# Patient Record
Sex: FEMALE | Race: WHITE | NOT HISPANIC OR LATINO | Employment: OTHER | URBAN - METROPOLITAN AREA
[De-identification: names, ages, dates, MRNs, and addresses within clinical notes are randomized per-mention and may not be internally consistent; named-entity substitution may affect disease eponyms.]

---

## 2017-10-07 ENCOUNTER — APPOINTMENT (OUTPATIENT)
Dept: GENERAL RADIOLOGY | Facility: HOSPITAL | Age: 67
End: 2017-10-07

## 2017-10-07 ENCOUNTER — HOSPITAL ENCOUNTER (EMERGENCY)
Facility: HOSPITAL | Age: 67
Discharge: HOME | End: 2017-10-08
Attending: EMERGENCY MEDICINE | Admitting: EMERGENCY MEDICINE

## 2017-10-07 DIAGNOSIS — J44.1 COPD EXACERBATION (HCC): Primary | ICD-10-CM

## 2017-10-07 DIAGNOSIS — J40 BRONCHITIS: ICD-10-CM

## 2017-10-07 LAB
ALBUMIN SERPL-MCNC: 3.9 G/DL (ref 3.5–5.2)
ALBUMIN/GLOB SERPL: 1.1 G/DL
ALP SERPL-CCNC: 110 U/L (ref 39–117)
ALT SERPL W P-5'-P-CCNC: 14 U/L (ref 1–33)
ANION GAP SERPL CALCULATED.3IONS-SCNC: 14.9 MMOL/L
AST SERPL-CCNC: 13 U/L (ref 1–32)
BASOPHILS # BLD AUTO: 0.02 10*3/MM3 (ref 0–0.2)
BASOPHILS NFR BLD AUTO: 0.3 % (ref 0–1.5)
BILIRUB SERPL-MCNC: 0.4 MG/DL (ref 0.1–1.2)
BUN BLD-MCNC: 12 MG/DL (ref 8–23)
BUN/CREAT SERPL: 15.8 (ref 7–25)
CALCIUM SPEC-SCNC: 9.6 MG/DL (ref 8.6–10.5)
CHLORIDE SERPL-SCNC: 98 MMOL/L (ref 98–107)
CO2 SERPL-SCNC: 24.1 MMOL/L (ref 22–29)
CREAT BLD-MCNC: 0.76 MG/DL (ref 0.57–1)
D-LACTATE SERPL-SCNC: 0.9 MMOL/L (ref 0.5–2)
DEPRECATED RDW RBC AUTO: 44.3 FL (ref 37–54)
EOSINOPHIL # BLD AUTO: 0.16 10*3/MM3 (ref 0–0.7)
EOSINOPHIL NFR BLD AUTO: 2.2 % (ref 0.3–6.2)
ERYTHROCYTE [DISTWIDTH] IN BLOOD BY AUTOMATED COUNT: 12.9 % (ref 11.7–13)
GFR SERPL CREATININE-BSD FRML MDRD: 76 ML/MIN/1.73
GLOBULIN UR ELPH-MCNC: 3.5 GM/DL
GLUCOSE BLD-MCNC: 118 MG/DL (ref 65–99)
HCT VFR BLD AUTO: 48.7 % (ref 35.6–45.5)
HGB BLD-MCNC: 16.2 G/DL (ref 11.9–15.5)
HOLD SPECIMEN: NORMAL
HOLD SPECIMEN: NORMAL
IMM GRANULOCYTES # BLD: 0 10*3/MM3 (ref 0–0.03)
IMM GRANULOCYTES NFR BLD: 0 % (ref 0–0.5)
INR PPP: 1 (ref 0.9–1.1)
LYMPHOCYTES # BLD AUTO: 2.45 10*3/MM3 (ref 0.9–4.8)
LYMPHOCYTES NFR BLD AUTO: 33.3 % (ref 19.6–45.3)
MCH RBC QN AUTO: 31.5 PG (ref 26.9–32)
MCHC RBC AUTO-ENTMCNC: 33.3 G/DL (ref 32.4–36.3)
MCV RBC AUTO: 94.7 FL (ref 80.5–98.2)
MONOCYTES # BLD AUTO: 0.34 10*3/MM3 (ref 0.2–1.2)
MONOCYTES NFR BLD AUTO: 4.6 % (ref 5–12)
NEUTROPHILS # BLD AUTO: 4.39 10*3/MM3 (ref 1.9–8.1)
NEUTROPHILS NFR BLD AUTO: 59.6 % (ref 42.7–76)
PLATELET # BLD AUTO: 300 10*3/MM3 (ref 140–500)
PMV BLD AUTO: 9.7 FL (ref 6–12)
POTASSIUM BLD-SCNC: 4.4 MMOL/L (ref 3.5–5.2)
PROCALCITONIN SERPL-MCNC: <0.02 NG/ML (ref 0.1–0.25)
PROT SERPL-MCNC: 7.4 G/DL (ref 6–8.5)
PROTHROMBIN TIME: 12.8 SECONDS (ref 11.7–14.2)
RBC # BLD AUTO: 5.14 10*6/MM3 (ref 3.9–5.2)
SODIUM BLD-SCNC: 137 MMOL/L (ref 136–145)
WBC NRBC COR # BLD: 7.36 10*3/MM3 (ref 4.5–10.7)
WHOLE BLOOD HOLD SPECIMEN: NORMAL
WHOLE BLOOD HOLD SPECIMEN: NORMAL

## 2017-10-07 PROCEDURE — 36415 COLL VENOUS BLD VENIPUNCTURE: CPT

## 2017-10-07 PROCEDURE — 83605 ASSAY OF LACTIC ACID: CPT | Performed by: EMERGENCY MEDICINE

## 2017-10-07 PROCEDURE — 84145 PROCALCITONIN (PCT): CPT | Performed by: EMERGENCY MEDICINE

## 2017-10-07 PROCEDURE — 71020 HC CHEST PA AND LATERAL: CPT

## 2017-10-07 PROCEDURE — 94799 UNLISTED PULMONARY SVC/PX: CPT

## 2017-10-07 PROCEDURE — 93005 ELECTROCARDIOGRAM TRACING: CPT

## 2017-10-07 PROCEDURE — 87040 BLOOD CULTURE FOR BACTERIA: CPT | Performed by: EMERGENCY MEDICINE

## 2017-10-07 PROCEDURE — 80053 COMPREHEN METABOLIC PANEL: CPT | Performed by: EMERGENCY MEDICINE

## 2017-10-07 PROCEDURE — 85025 COMPLETE CBC W/AUTO DIFF WBC: CPT | Performed by: EMERGENCY MEDICINE

## 2017-10-07 PROCEDURE — 85610 PROTHROMBIN TIME: CPT | Performed by: EMERGENCY MEDICINE

## 2017-10-07 PROCEDURE — 99284 EMERGENCY DEPT VISIT MOD MDM: CPT

## 2017-10-07 PROCEDURE — 94640 AIRWAY INHALATION TREATMENT: CPT

## 2017-10-07 PROCEDURE — 93010 ELECTROCARDIOGRAM REPORT: CPT | Performed by: INTERNAL MEDICINE

## 2017-10-07 RX ORDER — METOPROLOL SUCCINATE 25 MG/1
12.5 TABLET, EXTENDED RELEASE ORAL
COMMUNITY
Start: 2017-07-17

## 2017-10-07 RX ORDER — CHLORAL HYDRATE 500 MG
1000 CAPSULE ORAL
COMMUNITY

## 2017-10-07 RX ORDER — MELOXICAM 7.5 MG/1
7.5 TABLET ORAL 2 TIMES DAILY
COMMUNITY

## 2017-10-07 RX ORDER — ALBUTEROL SULFATE 2.5 MG/3ML
2.5 SOLUTION RESPIRATORY (INHALATION) ONCE
Status: COMPLETED | OUTPATIENT
Start: 2017-10-07 | End: 2017-10-07

## 2017-10-07 RX ORDER — CLOPIDOGREL BISULFATE 75 MG/1
75 TABLET ORAL DAILY
COMMUNITY
Start: 2017-07-20

## 2017-10-07 RX ORDER — FLUTICASONE PROPIONATE 50 MCG
2 SPRAY, SUSPENSION (ML) NASAL DAILY
COMMUNITY

## 2017-10-07 RX ORDER — ONDANSETRON HYDROCHLORIDE 8 MG/1
8 TABLET, FILM COATED ORAL EVERY 6 HOURS PRN
COMMUNITY

## 2017-10-07 RX ORDER — DOXYCYCLINE 100 MG/1
100 CAPSULE ORAL 2 TIMES DAILY
Qty: 14 CAPSULE | Refills: 0 | Status: SHIPPED | OUTPATIENT
Start: 2017-10-07

## 2017-10-07 RX ORDER — OMEPRAZOLE 20 MG/1
20 CAPSULE, DELAYED RELEASE ORAL DAILY
COMMUNITY
Start: 2017-01-12 | End: 2018-01-12

## 2017-10-07 RX ORDER — FLUTICASONE PROPIONATE 50 MCG
SPRAY, SUSPENSION (ML) NASAL
COMMUNITY
Start: 2017-07-24 | End: 2017-10-07 | Stop reason: SDUPTHER

## 2017-10-07 RX ORDER — IPRATROPIUM BROMIDE AND ALBUTEROL SULFATE 2.5; .5 MG/3ML; MG/3ML
3 SOLUTION RESPIRATORY (INHALATION) ONCE
Status: COMPLETED | OUTPATIENT
Start: 2017-10-07 | End: 2017-10-07

## 2017-10-07 RX ORDER — PREDNISONE 20 MG/1
20 TABLET ORAL DAILY
Qty: 15 TABLET | Refills: 0 | Status: SHIPPED | OUTPATIENT
Start: 2017-10-07

## 2017-10-07 RX ORDER — LORATADINE 10 MG/1
10 TABLET ORAL DAILY
COMMUNITY

## 2017-10-07 RX ORDER — HYDROCODONE BITARTRATE AND ACETAMINOPHEN 7.5; 325 MG/1; MG/1
1-2 TABLET ORAL EVERY 4 HOURS PRN
COMMUNITY

## 2017-10-07 RX ORDER — LORATADINE 10 MG/1
TABLET ORAL
COMMUNITY
Start: 2017-08-01 | End: 2017-10-07 | Stop reason: SDUPTHER

## 2017-10-07 RX ORDER — SODIUM CHLORIDE 0.9 % (FLUSH) 0.9 %
10 SYRINGE (ML) INJECTION AS NEEDED
Status: DISCONTINUED | OUTPATIENT
Start: 2017-10-07 | End: 2017-10-08 | Stop reason: HOSPADM

## 2017-10-07 RX ORDER — ALBUTEROL SULFATE 90 UG/1
2 AEROSOL, METERED RESPIRATORY (INHALATION) EVERY 4 HOURS PRN
Qty: 1 INHALER | Refills: 0 | Status: SHIPPED | OUTPATIENT
Start: 2017-10-07

## 2017-10-07 RX ORDER — ATORVASTATIN CALCIUM 20 MG/1
20 TABLET, FILM COATED ORAL DAILY
COMMUNITY
Start: 2017-08-31

## 2017-10-07 RX ADMIN — ALBUTEROL SULFATE 2.5 MG: 2.5 SOLUTION RESPIRATORY (INHALATION) at 21:42

## 2017-10-07 RX ADMIN — IPRATROPIUM BROMIDE AND ALBUTEROL SULFATE 3 ML: .5; 3 SOLUTION RESPIRATORY (INHALATION) at 21:42

## 2017-10-07 NOTE — ED NOTES
Pt had ear surgery Wednesday and it feels numb and swollen to her.      Bora France RN  10/07/17 7352

## 2017-10-08 VITALS
TEMPERATURE: 97.4 F | HEIGHT: 65 IN | DIASTOLIC BLOOD PRESSURE: 58 MMHG | SYSTOLIC BLOOD PRESSURE: 101 MMHG | WEIGHT: 137 LBS | BODY MASS INDEX: 22.82 KG/M2 | RESPIRATION RATE: 18 BRPM | OXYGEN SATURATION: 92 % | HEART RATE: 76 BPM

## 2017-10-08 NOTE — ED PROVIDER NOTES
"EMERGENCY DEPARTMENT ENCOUNTER    CHIEF COMPLAINT  Chief Complaint: SOB  History given by: pt  History limited by: N/A  Room Number: 31/31  PMD: Matty Lomeli MD      HPI:  Pt is a 67 y.o. female who presents complaining ofsudden SOB that began at approximately 1300 today and pt states she got to the point where \"she could hardly breathe\". Pt also c/o cough and dysphagia, but denies any other pertinent symptoms. Pt denies hx of pneumonia, but does have a hx COPD.    Duration: 8-9 hours  Onset: sudden   Timing: constant   Location: chest   Radiation: N/A  Quality: SOA  Intensity/Severity: moderate  Progression: unchanged   Associated Symptoms: cough   Aggravating Factors: None reported   Alleviating Factors: None reported  Previous Episodes: Pt has a hx of COPD.  Treatment before arrival: None reported.     PAST MEDICAL HISTORY  Active Ambulatory Problems     Diagnosis Date Noted   • No Active Ambulatory Problems     Resolved Ambulatory Problems     Diagnosis Date Noted   • No Resolved Ambulatory Problems     No Additional Past Medical History       PAST SURGICAL HISTORY  Past Surgical History:   Procedure Laterality Date   • INNER EAR SURGERY     • TUBAL ABDOMINAL LIGATION         FAMILY HISTORY  History reviewed. No pertinent family history.    SOCIAL HISTORY  Social History     Social History   • Marital status:      Spouse name: N/A   • Number of children: N/A   • Years of education: N/A     Occupational History   • Not on file.     Social History Main Topics   • Smoking status: Current Every Day Smoker     Packs/day: 0.50   • Smokeless tobacco: Not on file   • Alcohol use No   • Drug use: Not on file   • Sexual activity: Not on file     Other Topics Concern   • Not on file     Social History Narrative   • No narrative on file       ALLERGIES  Asa [aspirin] and Penicillins    REVIEW OF SYSTEMS  Review of Systems   Constitutional: Negative.  Negative for chills and fever.   HENT: Positive for trouble " swallowing. Negative for sore throat.    Eyes: Negative.    Respiratory: Positive for cough and shortness of breath.    Cardiovascular: Negative.  Negative for chest pain.   Gastrointestinal: Negative.    Genitourinary: Negative.  Negative for dysuria.   Musculoskeletal: Negative.  Negative for back pain.   Skin: Negative.  Negative for rash.   Neurological: Negative.  Negative for headaches.       PHYSICAL EXAM  ED Triage Vitals   Temp Heart Rate Resp BP SpO2   10/07/17 1804 10/07/17 1804 10/07/17 1804 10/07/17 1812 10/07/17 1804   97.4 °F (36.3 °C) 86 18 164/96 95 %      Temp src Heart Rate Source Patient Position BP Location FiO2 (%)   -- 10/07/17 1927 10/07/17 1812 10/07/17 1812 --    Monitor Sitting Left arm        Physical Exam   Constitutional: She is oriented to person, place, and time and well-developed, well-nourished, and in no distress. No distress.   HENT:   Head: Normocephalic and atraumatic.   Bandage over the right ear from a recent ear surgery. Will leave dressing in place.    Eyes: EOM are normal. Pupils are equal, round, and reactive to light.   Neck: Normal range of motion. Neck supple.   Cardiovascular: Normal rate, regular rhythm and normal heart sounds.    Pulmonary/Chest: Effort normal and breath sounds normal. No respiratory distress.   Diminshed breath sounds bilaterally    Abdominal: Soft. Bowel sounds are normal. There is no tenderness. There is no rebound and no guarding.   Musculoskeletal: Normal range of motion. She exhibits no edema.   Neurological: She is alert and oriented to person, place, and time. She has normal sensation and normal strength.   Skin: Skin is warm and dry. No rash noted.   Psychiatric: Mood and affect normal.   Nursing note and vitals reviewed.      LAB RESULTS  Lab Results (last 24 hours)     Procedure Component Value Units Date/Time    CBC & Differential [[id number]] Collected:  10/07/17 2156    Specimen:  Blood Updated:  10/07/17 2209    Narrative:       The  following orders were created for panel order CBC & Differential.  Procedure                               Abnormality         Status                     ---------                               -----------         ------                     CBC Auto Differential[[id number]]        Abnormal            Final result                 Please view results for these tests on the individual orders.    Comprehensive Metabolic Panel [[id number]]  (Abnormal) Collected:  10/07/17 2156    Specimen:  Blood Updated:  10/07/17 2230     Glucose 118 (H) mg/dL      BUN 12 mg/dL      Creatinine 0.76 mg/dL      Sodium 137 mmol/L      Potassium 4.4 mmol/L      Chloride 98 mmol/L      CO2 24.1 mmol/L      Calcium 9.6 mg/dL      Total Protein 7.4 g/dL      Albumin 3.90 g/dL      ALT (SGPT) 14 U/L      AST (SGOT) 13 U/L      Alkaline Phosphatase 110 U/L      Total Bilirubin 0.4 mg/dL      eGFR Non African Amer 76 mL/min/1.73      Globulin 3.5 gm/dL      A/G Ratio 1.1 g/dL      BUN/Creatinine Ratio 15.8     Anion Gap 14.9 mmol/L     Protime-INR [[id number]]  (Normal) Collected:  10/07/17 2156    Specimen:  Blood Updated:  10/07/17 2219     Protime 12.8 Seconds      INR 1.00    Blood Culture - Blood, [[id number]] Collected:  10/07/17 2156    Specimen:  Blood from Arm, Left Updated:  10/07/17 2200    Lactic Acid, Plasma [[id number]]  (Normal) Collected:  10/07/17 2156    Specimen:  Blood Updated:  10/07/17 2222     Lactate 0.9 mmol/L     Procalcitonin [[id number]]  (Abnormal) Collected:  10/07/17 2156    Specimen:  Blood Updated:  10/07/17 2250     Procalcitonin <0.02 (L) ng/mL     Narrative:       As a Marker for Sepsis (Non-Neonates):   1. <0.5 ng/mL represents a low risk of severe sepsis and/or septic shock.  1. >2 ng/mL represents a high risk of severe sepsis and/or septic shock.    As a Marker for Lower Respiratory Tract Infections that require antibiotic therapy:  PCT on Admission     Antibiotic Therapy             6-12 Hrs later  > 0.5             "    Strongly Recommended            >0.25 - <0.5         Recommended  0.1 - 0.25           Discouraged                   Remeasure/reassess PCT  <0.1                 Strongly Discouraged          Remeasure/reassess PCT      As 28 day mortality risk marker: \"Change in Procalcitonin Result\" (> 80 % or <=80 %) if Day 0 (or Day 1) and Day 4 values are available. Refer to http://www.[employer] [facility] [employer] Ipct-calculator.com/   Change in PCT <=80 %   A decrease of PCT levels below or equal to 80 % defines a positive change in PCT test result representing a higher risk for 28-day all-cause mortality of patients diagnosed with severe sepsis or septic shock.  Change in PCT > 80 %   A decrease of PCT levels of more than 80 % defines a negative change in PCT result representing a lower risk for 28-day all-cause mortality of patients diagnosed with severe sepsis or septic shock.                CBC Auto Differential [[id number]]  (Abnormal) Collected:  10/07/17 2156    Specimen:  Blood Updated:  10/07/17 2209     WBC 7.36 10*3/mm3      RBC 5.14 10*6/mm3      Hemoglobin 16.2 (H) g/dL      Hematocrit 48.7 (H) %      MCV 94.7 fL      MCH 31.5 pg      MCHC 33.3 g/dL      RDW 12.9 %      RDW-SD 44.3 fl      MPV 9.7 fL      Platelets 300 10*3/mm3      Neutrophil % 59.6 %      Lymphocyte % 33.3 %      Monocyte % 4.6 (L) %      Eosinophil % 2.2 %      Basophil % 0.3 %      Immature Grans % 0.0 %      Neutrophils, Absolute 4.39 10*3/mm3      Lymphocytes, Absolute 2.45 10*3/mm3      Monocytes, Absolute 0.34 10*3/mm3      Eosinophils, Absolute 0.16 10*3/mm3      Basophils, Absolute 0.02 10*3/mm3      Immature Grans, Absolute 0.00 10*3/mm3     Blood Culture - Blood, [[id number]] Collected:  10/07/17 2218    Specimen:  Blood from Arm, Left Updated:  10/07/17 2227          I ordered the above labs and reviewed the results    RADIOLOGY  XR Chest 2 View   Preliminary Result   Opacity at the left lung base may be atelectasis or infiltrate.                   I " ordered the above noted radiological studies. Interpreted by radiologist. Reviewed by me in PACS.       PROCEDURES  Procedures  EKG           EKG time: 1834  Rhythm/Rate: rate 76, sinus rhythm  P waves and HI: normal  QRS, axis: normal  ST and T waves: Nonspecific    Occasional PAC    Interpreted Contemporaneously by me, independently viewed  No previous for comparison.       PROGRESS AND CONSULTS  ED Course     2127: Ordered labs and Chest XR for further evaluation. Ordered Duo-neb and Proventil for SOB.     0011: Rechecked pt who states she is feeling improved after breathing treatments were administered. Discussed lab and radiology results with pt. Discussed that it appears pt has bronchitis; plan to place pt on steroids and discharge pt home. Discussed there are no signs of pneumonia. Pt understands and agrees to plan. All questions addressed at time.     MEDICAL DECISION MAKING  Results were reviewed/discussed with the patient and they were also made aware of online access. Pt also made aware that some labs, such as cultures, will not be resulted during ER visit and follow up with PMD is necessary.     MDM  Number of Diagnoses or Management Options     Amount and/or Complexity of Data Reviewed  Clinical lab tests: ordered and reviewed (Procalcitonin <0.02, HGB = 16.2)  Tests in the radiology section of CPT®: ordered and reviewed (Chest XR Impression   Opacity at the left lung base may be atelectasis or infiltrate.       )  Tests in the medicine section of CPT®: ordered and reviewed (Refer to procedure. )  Independent visualization of images, tracings, or specimens: yes           DIAGNOSIS  Final diagnoses:   COPD exacerbation   Bronchitis       DISPOSITION  DISCHARGE    Patient discharged in stable condition.    Reviewed implications of results, diagnosis, meds, responsibility to follow up, warning signs and symptoms of possible worsening, potential complications and reasons to return to ER.    Patient/Family  voiced understanding of above instructions.    Discussed plan for discharge, as there is no emergent indication for admission.  Pt/family is agreeable and understands need for follow up and repeat testing.  Pt is aware that discharge does not mean that nothing is wrong but it indicates no emergency is present that requires admission and they must continue care with follow-up as given below or physician of their choice.     FOLLOW-UP  Matty Lomeli MD  [address]  [facility] [address]  [place] [address]  [phone]    Schedule an appointment as soon as possible for a visit in 1 day           Medication List      New Prescriptions          albuterol 108 (90 Base) MCG/ACT inhaler   Commonly known as:  PROVENTIL HFA;VENTOLIN HFA   Inhale 2 puffs Every 4 (Four) Hours As Needed for Wheezing.       doxycycline 100 MG capsule   Commonly known as:  MONODOX   Take 1 capsule by mouth 2 (Two) Times a Day.       predniSONE 20 MG tablet   Commonly known as:  DELTASONE   Take 1 tablet by mouth Daily.               Latest Documented Vital Signs:  As of 12:14 AM  BP- 106/77 HR- 82 Temp- 97.4 °F (36.3 °C) O2 sat- 92%    --  Documentation assistance provided by lashonda Fontana for Dr. Franco.  Information recorded by the scribe was done at my direction and has been verified and validated by me.     Camron Fontana  10/07/17 8680       Camron Fontana  10/08/17 0014       Blayne Franco MD  10/08/17 0310

## 2017-10-12 LAB
BACTERIA SPEC AEROBE CULT: NORMAL
BACTERIA SPEC AEROBE CULT: NORMAL

## 2017-11-27 ENCOUNTER — TRANSCRIBE ORDERS (OUTPATIENT)
Dept: GASTROENTEROLOGY | Facility: CLINIC | Age: 67
End: 2017-11-27

## 2017-11-27 DIAGNOSIS — Z80.0 FAMILY HISTORY OF GI MALIGNANCY: ICD-10-CM

## 2017-11-27 DIAGNOSIS — Z12.11 ENCOUNTER FOR SCREENING FOR MALIGNANT NEOPLASM OF COLON: Primary | ICD-10-CM

## 2017-12-11 PROBLEM — Z12.11 ENCOUNTER FOR SCREENING FOR MALIGNANT NEOPLASM OF COLON: Status: ACTIVE | Noted: 2017-12-11

## 2017-12-11 PROBLEM — Z80.0 FAMILY HISTORY OF GI MALIGNANCY: Status: ACTIVE | Noted: 2017-12-11

## 2018-02-16 ENCOUNTER — ANESTHESIA EVENT (OUTPATIENT)
Dept: GASTROENTEROLOGY | Facility: HOSPITAL | Age: 68
End: 2018-02-16

## 2018-02-16 ENCOUNTER — ANESTHESIA (OUTPATIENT)
Dept: GASTROENTEROLOGY | Facility: HOSPITAL | Age: 68
End: 2018-02-16

## 2018-02-16 ENCOUNTER — HOSPITAL ENCOUNTER (OUTPATIENT)
Facility: HOSPITAL | Age: 68
Setting detail: HOSPITAL OUTPATIENT SURGERY
Discharge: HOME | End: 2018-02-16
Attending: INTERNAL MEDICINE | Admitting: INTERNAL MEDICINE

## 2018-02-16 VITALS
WEIGHT: 136.31 LBS | RESPIRATION RATE: 16 BRPM | BODY MASS INDEX: 22.71 KG/M2 | DIASTOLIC BLOOD PRESSURE: 87 MMHG | HEIGHT: 65 IN | TEMPERATURE: 98.3 F | OXYGEN SATURATION: 96 % | HEART RATE: 76 BPM | SYSTOLIC BLOOD PRESSURE: 140 MMHG

## 2018-02-16 PROCEDURE — 25010000002 PROPOFOL 10 MG/ML EMULSION: Performed by: ANESTHESIOLOGY

## 2018-02-16 PROCEDURE — S0260 H&P FOR SURGERY: HCPCS | Performed by: INTERNAL MEDICINE

## 2018-02-16 PROCEDURE — G0105 COLORECTAL SCRN; HI RISK IND: HCPCS | Performed by: INTERNAL MEDICINE

## 2018-02-16 RX ORDER — PROPOFOL 10 MG/ML
VIAL (ML) INTRAVENOUS AS NEEDED
Status: DISCONTINUED | OUTPATIENT
Start: 2018-02-16 | End: 2018-02-16 | Stop reason: SURG

## 2018-02-16 RX ORDER — SODIUM CHLORIDE, SODIUM LACTATE, POTASSIUM CHLORIDE, CALCIUM CHLORIDE 600; 310; 30; 20 MG/100ML; MG/100ML; MG/100ML; MG/100ML
30 INJECTION, SOLUTION INTRAVENOUS CONTINUOUS PRN
Status: DISCONTINUED | OUTPATIENT
Start: 2018-02-16 | End: 2018-02-16 | Stop reason: HOSPADM

## 2018-02-16 RX ORDER — PROPOFOL 10 MG/ML
VIAL (ML) INTRAVENOUS CONTINUOUS PRN
Status: DISCONTINUED | OUTPATIENT
Start: 2018-02-16 | End: 2018-02-16 | Stop reason: SURG

## 2018-02-16 RX ORDER — LIDOCAINE HYDROCHLORIDE 20 MG/ML
INJECTION, SOLUTION INFILTRATION; PERINEURAL AS NEEDED
Status: DISCONTINUED | OUTPATIENT
Start: 2018-02-16 | End: 2018-02-16 | Stop reason: SURG

## 2018-02-16 RX ORDER — SODIUM CHLORIDE 9 MG/ML
30 INJECTION, SOLUTION INTRAVENOUS CONTINUOUS PRN
Status: DISCONTINUED | OUTPATIENT
Start: 2018-02-16 | End: 2018-02-16 | Stop reason: HOSPADM

## 2018-02-16 RX ADMIN — PROPOFOL 40 MG: 10 INJECTION, EMULSION INTRAVENOUS at 09:16

## 2018-02-16 RX ADMIN — LIDOCAINE HYDROCHLORIDE 40 MG: 20 INJECTION, SOLUTION INFILTRATION; PERINEURAL at 09:15

## 2018-02-16 RX ADMIN — PROPOFOL 60 MG: 10 INJECTION, EMULSION INTRAVENOUS at 09:22

## 2018-02-16 RX ADMIN — SODIUM CHLORIDE, POTASSIUM CHLORIDE, SODIUM LACTATE AND CALCIUM CHLORIDE 30 ML/HR: 600; 310; 30; 20 INJECTION, SOLUTION INTRAVENOUS at 09:06

## 2018-02-16 RX ADMIN — SODIUM CHLORIDE, POTASSIUM CHLORIDE, SODIUM LACTATE AND CALCIUM CHLORIDE: 600; 310; 30; 20 INJECTION, SOLUTION INTRAVENOUS at 09:10

## 2018-02-16 RX ADMIN — PROPOFOL 160 MCG/KG/MIN: 10 INJECTION, EMULSION INTRAVENOUS at 09:22

## 2018-02-16 NOTE — H&P
[facility]  Pre Procedure History & Physical    Chief Complaint:   Screening, family history    Subjective     HPI:   This 67-year-old female presents to the endoscopy suite for colonoscopic evaluation.  She last underwent colonoscopy in 2010.  She does carry a family history of colon cancer involving her brother.    Past Medical History:   Past Medical History:   Diagnosis Date   • Cancer     right calf   • H/O colonoscopy with polypectomy    • Stroke    • Tachycardia        Past Surgical History:  Past Surgical History:   Procedure Laterality Date   • CARDIAC DEFIBRILLATOR PLACEMENT  April 2017 placed   • INNER EAR SURGERY     • TUBAL ABDOMINAL LIGATION         Family History:  Family History   Problem Relation Age of Onset   • Colon polyps Brother        Social History:   reports that she has been smoking.  She has been smoking about 0.50 packs per day. She does not have any smokeless tobacco history on file. She reports that she does not drink alcohol.    Medications:   Prescriptions Prior to Admission   Medication Sig Dispense Refill Last Dose   • atorvastatin (LIPITOR) 20 MG tablet Take 20 mg by mouth Daily.   2/15/2018 at Unknown time   • clopidogrel (PLAVIX) 75 MG tablet Take 75 mg by mouth Daily.   2/15/2018 at Unknown time   • doxycycline (MONODOX) 100 MG capsule Take 1 capsule by mouth 2 (Two) Times a Day. 14 capsule 0 2/15/2018 at Unknown time   • loratadine (CLARITIN) 10 MG tablet Take 10 mg by mouth Daily.   2/15/2018 at Unknown time   • metoprolol succinate XL (TOPROL-XL) 25 MG 24 hr tablet Take 12.5 mg by mouth.   2/15/2018 at Unknown time   • Multiple Vitamins-Minerals (MULTIVITAMIN ADULT PO) Take 1 tablet by mouth Daily.   Past Week at Unknown time   • albuterol (PROVENTIL HFA;VENTOLIN HFA) 108 (90 Base) MCG/ACT inhaler Inhale 2 puffs Every 4 (Four) Hours As Needed for Wheezing. 1 inhaler 0 Unknown at Unknown time   • fluticasone (FLONASE) 50 MCG/ACT nasal spray 2 sprays  "into each nostril Daily.   More than a month at Unknown time   • HYDROcodone-acetaminophen (NORCO) 7.5-325 MG per tablet Take 1-2 tablets by mouth Every 4 (Four) Hours As Needed for Moderate Pain .      • meloxicam (MOBIC) 7.5 MG tablet Take 7.5 mg by mouth 2 (Two) Times a Day.      • Omega-3 Fatty Acids (FISH OIL) 1000 MG capsule capsule Take 1,000 mg by mouth Daily With Breakfast.   2/13/2018   • ondansetron (ZOFRAN) 8 MG tablet Take 8 mg by mouth Every 6 (Six) Hours As Needed for Nausea or Vomiting.      • predniSONE (DELTASONE) 20 MG tablet Take 1 tablet by mouth Daily. 15 tablet 0        Allergies:  Asa [aspirin] and Penicillins    ROS:    Pertinent items are noted in HPI, all other systems reviewed and negative     Objective     Blood pressure 124/75, pulse 82, temperature 97.7 °F (36.5 °C), temperature source Oral, resp. rate 16, height 165.1 cm (65\"), weight 61.8 kg (136 lb 5 oz), SpO2 95 %.    Physical Exam   Constitutional: Pt is oriented to person, place, and time and well-developed, well-nourished, and in no distress.   Mouth/Throat: Oropharynx is clear and moist.   Neck: Normal range of motion.   Cardiovascular: Normal rate, regular rhythm and normal heart sounds.    Pulmonary/Chest: Effort normal and breath sounds normal.   Abdominal: Soft. Nontender  Skin: Skin is warm and dry.   Psychiatric: Mood, memory, affect and judgment normal.     Assessment/Plan     Diagnosis:  Screening for colorectal cancer  Family history    Anticipated Surgical Procedure:  Colonoscopy    The risks, benefits, and alternatives of this procedure have been discussed with the patient or the responsible party- the patient understands and agrees to proceed.                                                          "

## 2018-02-16 NOTE — ANESTHESIA PREPROCEDURE EVALUATION
Anesthesia Evaluation     Nursing notes reviewed   no history of anesthetic complications:  NPO Solid Status: > 8 hours  NPO Liquid Status: > 8 hours           Airway   Mallampati: I  Dental    (+) upper dentures and edentulous    Pulmonary    Cardiovascular     PT is on anticoagulation therapy  Rhythm: regular  Rate: normal    (+) dysrhythmias,   (-) pacemaker    ROS comment: Implantable cardiac monitor    Neuro/Psych  (+) CVA,     GI/Hepatic/Renal/Endo      Musculoskeletal     Abdominal    Substance History      OB/GYN          Other                      Anesthesia Plan    ASA 3     MAC     intravenous induction   Anesthetic plan and risks discussed with patient.

## 2018-02-16 NOTE — ANESTHESIA PROCEDURE NOTES
Peripheral IV    Patient location during procedure: OR  Line placed for Other (Current iv infiltrating).  Performed By   Anesthesiologist: JESSIE VILLALOBOS  Preanesthetic Checklist  Completed: patient identified, site marked, surgical consent, pre-op evaluation, timeout performed, risks and benefits discussed and monitors and equipment checked  Peripheral IV Prep   Prep: ChloraPrep  Patient monitoring: continuous pulse ox and heart rate  Peripheral IV Procedure   Laterality:left  Location:  Hand  Catheter size: 20 G         Post Assessment   Dressing Type: transparent.    IV Dressing/Site: clean

## 2018-02-16 NOTE — ADDENDUM NOTE
Addendum  created 02/16/18 1541 by Mele Sood MD    Anesthesia Review and Sign - Ready for Procedure

## 2018-02-16 NOTE — PLAN OF CARE
Problem: Patient Care Overview (Adult)  Goal: Plan of Care Review  Outcome: Ongoing (interventions implemented as appropriate)   02/16/18 0830   Coping/Psychosocial Response Interventions   Plan Of Care Reviewed With patient   Patient Care Overview   Progress no change     Goal: Adult Individualization and Mutuality  Outcome: Ongoing (interventions implemented as appropriate)    Goal: Discharge Needs Assessment  Outcome: Ongoing (interventions implemented as appropriate)   02/16/18 0830   Discharge Needs Assessment   Concerns To Be Addressed basic needs concerns   Discharge Disposition home or self-care   Living Environment   Transportation Available car       Problem: GI Endoscopy (Adult)  Goal: Signs and Symptoms of Listed Potential Problems Will be Absent or Manageable (GI Endoscopy)  Outcome: Ongoing (interventions implemented as appropriate)   02/16/18 0830   GI Endoscopy   Problems Assessed (GI Endoscopy) pain   Problems Present (GI Endoscopy) none

## 2018-02-16 NOTE — ANESTHESIA POSTPROCEDURE EVALUATION
"Patient: Brittney Crooks    Procedure Summary     Date Anesthesia Start Anesthesia Stop Room / Location    02/16/18 0914 0942  [facility] /  [facility]       Procedure Diagnosis Surgeon Provider    COLONOSCOPY INTO CECUM & TERMINAL ILEUM (N/A ) Diverticulosis; Hemorrhoids  (Encounter for screening for malignant neoplasm of colon [Z12.11]; Family history of GI malignancy [Z80.0]) MD Dave Edmond MD          Anesthesia Type: MAC  Last vitals  BP   140/87 (02/16/18 1002)   Temp   36.8 °C (98.3 °F) (02/16/18 0952)   Pulse   76 (02/16/18 1002)   Resp   16 (02/16/18 1002)     SpO2   96 % (02/16/18 1002)     Post Anesthesia Care and Evaluation    Patient location during evaluation: PACU  Patient participation: complete - patient participated  Level of consciousness: awake  Pain score: 0  Pain management: adequate  Airway patency: patent  Anesthetic complications: No anesthetic complications    Cardiovascular status: acceptable  Respiratory status: acceptable  Hydration status: acceptable    Comments: Blood pressure 140/87, pulse 76, temperature 36.8 °C (98.3 °F), temperature source Oral, resp. rate 16, height 165.1 cm (65\"), weight 61.8 kg (136 lb 5 oz), SpO2 96 %.    No anesthesia care post op    "

## 2021-02-02 ENCOUNTER — TRANSCRIBE ORDERS (OUTPATIENT)
Dept: ADMINISTRATIVE | Facility: HOSPITAL | Age: 71
End: 2021-02-02

## 2021-02-02 DIAGNOSIS — Z12.31 SCREENING MAMMOGRAM, ENCOUNTER FOR: Primary | ICD-10-CM

## 2021-05-20 ENCOUNTER — HOSPITAL ENCOUNTER (OUTPATIENT)
Dept: MAMMOGRAPHY | Facility: HOSPITAL | Age: 71
Discharge: HOME | End: 2021-05-20
Admitting: INTERNAL MEDICINE

## 2021-05-20 DIAGNOSIS — Z12.31 SCREENING MAMMOGRAM, ENCOUNTER FOR: ICD-10-CM

## 2021-05-20 PROCEDURE — 77063 BREAST TOMOSYNTHESIS BI: CPT

## 2021-05-20 PROCEDURE — 77067 SCR MAMMO BI INCL CAD: CPT

## 2022-04-11 ENCOUNTER — TRANSCRIBE ORDERS (OUTPATIENT)
Dept: ADMINISTRATIVE | Facility: HOSPITAL | Age: 72
End: 2022-04-11

## 2022-04-11 DIAGNOSIS — Z12.31 ENCOUNTER FOR SCREENING MAMMOGRAM FOR BREAST CANCER: Primary | ICD-10-CM

## 2022-07-07 ENCOUNTER — HOSPITAL ENCOUNTER (OUTPATIENT)
Dept: MAMMOGRAPHY | Facility: HOSPITAL | Age: 72
Discharge: HOME | End: 2022-07-07
Admitting: INTERNAL MEDICINE

## 2022-07-07 DIAGNOSIS — Z12.31 ENCOUNTER FOR SCREENING MAMMOGRAM FOR BREAST CANCER: ICD-10-CM

## 2022-07-07 PROCEDURE — 77067 SCR MAMMO BI INCL CAD: CPT

## 2022-07-07 PROCEDURE — 77063 BREAST TOMOSYNTHESIS BI: CPT

## 2022-12-21 ENCOUNTER — DOCUMENTATION (OUTPATIENT)
Dept: GASTROENTEROLOGY | Facility: CLINIC | Age: 72
End: 2022-12-21

## 2023-04-21 ENCOUNTER — PRE-PROCEDURE SCREENING (OUTPATIENT)
Dept: GASTROENTEROLOGY | Facility: CLINIC | Age: 73
End: 2023-04-21
Payer: MEDICARE

## 2023-04-21 NOTE — TELEPHONE ENCOUNTER
LAST SCOPE 2/18 IN Epic --No personal history of polyps--Family history of polyps AND  colon cancer--ASPIRIN PLAVIX--Medications:                 atorvastatin (LIPITOR) 20 MG tablet  clopidogrel (PLAVIX) 75 MG tablet  metoprolol succinate XL (TOPROL-XL) 25 MG 24 hr tablet  ASPIRIN        QUESTIONNAIRE SCREENING  SCAN IN  MEDIA & HAS BEEN SENT TO DOCTOR FOR REVIEW

## 2023-04-25 ENCOUNTER — TELEPHONE (OUTPATIENT)
Dept: GASTROENTEROLOGY | Facility: CLINIC | Age: 73
End: 2023-04-25

## 2023-04-25 DIAGNOSIS — Z80.0 FAMILY HISTORY OF GI MALIGNANCY: ICD-10-CM

## 2023-04-25 DIAGNOSIS — Z12.12 SCREENING FOR COLORECTAL CANCER: Primary | ICD-10-CM

## 2023-04-25 DIAGNOSIS — Z12.11 SCREENING FOR COLORECTAL CANCER: Primary | ICD-10-CM

## 2023-04-25 DIAGNOSIS — Z83.71 FAMILY HISTORY OF POLYPS IN THE COLON: ICD-10-CM

## 2023-04-25 NOTE — TELEPHONE ENCOUNTER
Caller: Brittney Crooks    Relationship to patient: Self    Best call back number: [phone]    Patient is needing: PATIENT STATES SHE WAS ON THE PHONE WITH SOMEONE FROM THE OFFICE, SHE FORGOT TO LET THEM KNOW SHE WAS ON BLOOD THINNER/ XAREL 20 MG. DR ADELSO COLVIN WAS THE PRESCRIBING PROVIDER AT [facility]. IF YOU NEED TO REACH HER SHE CAN BE REACHED [phone], ANYTIME, IT IS ALSO OK TO LEAVE A VOICE ME

## 2023-05-09 ENCOUNTER — TELEPHONE (OUTPATIENT)
Dept: GASTROENTEROLOGY | Facility: CLINIC | Age: 73
End: 2023-05-09
Payer: MEDICARE

## 2023-05-09 PROBLEM — Z83.71 FAMILY HISTORY OF POLYPS IN THE COLON: Status: ACTIVE | Noted: 2023-05-09

## 2023-05-09 PROBLEM — Z83.719 FAMILY HISTORY OF POLYPS IN THE COLON: Status: ACTIVE | Noted: 2023-05-09

## 2023-05-09 NOTE — TELEPHONE ENCOUNTER
Insurance typically requires the patient to be seen in the office prior to performing EGD unfortunately.  So I would see if we can get her in the office before her colonoscopy so we can document this appropriately and add the EGD on.  We will have to find a spot to work her in.

## 2023-05-09 NOTE — TELEPHONE ENCOUNTER
----- Message from Maikol Webb Rep sent at 5/9/2023  1:40 PM EDT -----  Dr. Lauren,     Pt scheduled for C/S 7-   PT states that per Dr. Kim [phone] that EGD also be performed w/C/S due to pts throat cancer history.  Please inform if order is amended.  Thanks.

## 2023-05-09 NOTE — TELEPHONE ENCOUNTER
ELIF patient via telephone for. Scheduled COLONOSCOPY   7- with arrival time of 1PM. Prep paperwork mailed to verified address on file. Patient advised arrival time may change based on [facility] guidelines. ELIF RED.  PT requested that per Dr. Kim [phone] that EGD also be performed w/C/S due to pts throat cancer history.  Sending message to Dr. Lauren.

## 2023-05-16 ENCOUNTER — TRANSCRIBE ORDERS (OUTPATIENT)
Dept: ADMINISTRATIVE | Facility: HOSPITAL | Age: 73
End: 2023-05-16
Payer: MEDICARE

## 2023-05-16 DIAGNOSIS — Z12.31 VISIT FOR SCREENING MAMMOGRAM: Primary | ICD-10-CM

## 2023-05-17 ENCOUNTER — TELEPHONE (OUTPATIENT)
Dept: GASTROENTEROLOGY | Facility: CLINIC | Age: 73
End: 2023-05-17
Payer: MEDICARE

## 2023-05-17 NOTE — TELEPHONE ENCOUNTER
SW pt to followup her request for EGD to be scheduled w/ scheduled C/S scope.  Per Sneha Phillips RN.  Dr. Lauren would like to see pt in office first.  No OV availability until July 2023.  Sent message to TYRA Allen to expedite appointment.

## 2023-05-19 ENCOUNTER — TELEPHONE (OUTPATIENT)
Dept: GASTROENTEROLOGY | Facility: CLINIC | Age: 73
End: 2023-05-19
Payer: MEDICARE

## 2023-05-25 ENCOUNTER — OFFICE VISIT (OUTPATIENT)
Dept: GASTROENTEROLOGY | Facility: CLINIC | Age: 73
End: 2023-05-25
Payer: MEDICARE

## 2023-05-25 VITALS
TEMPERATURE: 95 F | BODY MASS INDEX: 21.76 KG/M2 | SYSTOLIC BLOOD PRESSURE: 95 MMHG | WEIGHT: 130.6 LBS | DIASTOLIC BLOOD PRESSURE: 70 MMHG | HEIGHT: 65 IN | HEART RATE: 60 BPM

## 2023-05-25 DIAGNOSIS — R13.10 ODYNOPHAGIA: ICD-10-CM

## 2023-05-25 DIAGNOSIS — R13.12 OROPHARYNGEAL DYSPHAGIA: Primary | ICD-10-CM

## 2023-05-25 DIAGNOSIS — K63.5 POLYP OF COLON, UNSPECIFIED PART OF COLON, UNSPECIFIED TYPE: ICD-10-CM

## 2023-05-25 RX ORDER — METOCLOPRAMIDE 10 MG/1
10 TABLET ORAL
COMMUNITY

## 2023-05-25 RX ORDER — ASPIRIN 81 MG/1
81 TABLET ORAL DAILY
COMMUNITY

## 2023-05-25 RX ORDER — LACTOBACILLUS RHAMNOSUS GG 10B CELL
CAPSULE ORAL DAILY
COMMUNITY

## 2023-05-25 RX ORDER — CETIRIZINE HYDROCHLORIDE 1 MG/ML
SOLUTION ORAL DAILY
COMMUNITY

## 2023-05-25 NOTE — PROGRESS NOTES
Chief Complaint   Patient presents with   • Difficulty Swallowing   • throat cancer        Brittney Crooks is a  73 y.o. female here for a follow up visit for oral pharyngeal dysphagia, history of throat cancer with radiation therapy, history of polyps    HPI this 73-year-old white female patient of Dr. Matty Lomeli has been evaluated by the ENT service and underwent treatment for throat cancer including radiation therapy.  She has had subsequent issues with dysphagia and evidently underwent endoscopic placement of a feeding tube in October of last year.  She had painful swallowing although that has improved.  She states she has had speech pathology assessment on 3 occasions and has had multiple indirect laryngoscopy's as well.  ENT now wishes to have her undergo upper endoscopic evaluation to assess for any potential esophageal involvement related to her radiation therapy.  She is due for colonoscopic examination in July of this year and we can add this to that evaluation.  She is on Xarelto because of blood clot formation and would need clearance by her cardiologist to make sure this can be stopped safely.    Past Medical History:   Diagnosis Date   • Cancer     right calf   • H/O colonoscopy with polypectomy    • Stroke    • Tachycardia        Current Outpatient Medications   Medication Sig Dispense Refill   • aspirin (Aspir-Low) 81 MG EC tablet Take 1 tablet by mouth Daily.     • atorvastatin (LIPITOR) 20 MG tablet Take 1 tablet by mouth Daily.     • Cetirizine HCl (zyrTEC) 1 MG/ML syrup Take  by mouth Daily.     • metoclopramide (REGLAN) 10 MG tablet Take 1 tablet by mouth 4 (Four) Times a Day Before Meals & at Bedtime.     • metoprolol succinate XL (TOPROL-XL) 25 MG 24 hr tablet Take 12.5 mg by mouth.     • probiotic (CULTURELLE) capsule capsule Take  by mouth Daily.     • rivaroxaban (XARELTO) 10 MG tablet Take 2 tablets by mouth Daily.     • albuterol (PROVENTIL HFA;VENTOLIN HFA) 108 (90 Base) MCG/ACT  inhaler Inhale 2 puffs Every 4 (Four) Hours As Needed for Wheezing. (Patient not taking: Reported on 5/25/2023) 1 inhaler 0   • clopidogrel (PLAVIX) 75 MG tablet Take 75 mg by mouth Daily. (Patient not taking: Reported on 5/25/2023)     • doxycycline (MONODOX) 100 MG capsule Take 1 capsule by mouth 2 (Two) Times a Day. (Patient not taking: Reported on 5/25/2023) 14 capsule 0   • fluticasone (FLONASE) 50 MCG/ACT nasal spray 2 sprays into each nostril Daily. (Patient not taking: Reported on 5/25/2023)     • HYDROcodone-acetaminophen (NORCO) 7.5-325 MG per tablet Take 1-2 tablets by mouth Every 4 (Four) Hours As Needed for Moderate Pain . (Patient not taking: Reported on 5/25/2023)     • loratadine (CLARITIN) 10 MG tablet Take 10 mg by mouth Daily. (Patient not taking: Reported on 5/25/2023)     • meloxicam (MOBIC) 7.5 MG tablet Take 7.5 mg by mouth 2 (Two) Times a Day. (Patient not taking: Reported on 5/25/2023)     • Multiple Vitamins-Minerals (MULTIVITAMIN ADULT PO) Take 1 tablet by mouth Daily. (Patient not taking: Reported on 5/25/2023)     • Omega-3 Fatty Acids (FISH OIL) 1000 MG capsule capsule Take 1,000 mg by mouth Daily With Breakfast. (Patient not taking: Reported on 5/25/2023)     • ondansetron (ZOFRAN) 8 MG tablet Take 8 mg by mouth Every 6 (Six) Hours As Needed for Nausea or Vomiting. (Patient not taking: Reported on 5/25/2023)     • predniSONE (DELTASONE) 20 MG tablet Take 1 tablet by mouth Daily. (Patient not taking: Reported on 5/25/2023) 15 tablet 0     No current facility-administered medications for this visit.       PRN Meds:.    Allergies   Allergen Reactions   • Penicillins Swelling and Shortness Of Breath   • Asa [Aspirin] Swelling       Social History     Socioeconomic History   • Marital status:    Tobacco Use   • Smoking status: Every Day     Packs/day: 0.50     Types: Cigarettes   Substance and Sexual Activity   • Alcohol use: No     Comment: rarely       Family History   Problem  Relation Age of Onset   • Colon polyps Brother    • Breast cancer Neg Hx        Review of Systems   Constitutional: Negative for activity change, appetite change, fatigue and unexpected weight change.   HENT: Negative for congestion, facial swelling, sore throat, trouble swallowing and voice change.    Eyes: Negative for photophobia and visual disturbance.   Respiratory: Negative for cough and choking.    Cardiovascular: Negative for chest pain.   Gastrointestinal: Negative for abdominal distention, abdominal pain, anal bleeding, blood in stool, constipation, diarrhea, nausea, rectal pain and vomiting.        Dysphagia   Endocrine: Negative for polyphagia.   Musculoskeletal: Negative for arthralgias, gait problem and joint swelling.   Skin: Negative for color change, pallor and rash.   Allergic/Immunologic: Negative for food allergies.   Neurological: Negative for speech difficulty and headaches.   Hematological: Does not bruise/bleed easily.   Psychiatric/Behavioral: Negative for agitation, confusion and sleep disturbance.       Vitals:    05/25/23 1512   BP: 95/70   Pulse: 60   Temp: 95 °F (35 °C)       Physical Exam  Constitutional:       Appearance: She is well-developed.   HENT:      Head: Normocephalic.   Eyes:      Conjunctiva/sclera: Conjunctivae normal.   Cardiovascular:      Rate and Rhythm: Normal rate and regular rhythm.   Pulmonary:      Breath sounds: Normal breath sounds.   Abdominal:      General: Bowel sounds are normal.      Palpations: Abdomen is soft.   Musculoskeletal:         General: Normal range of motion.      Cervical back: Normal range of motion.   Skin:     General: Skin is warm and dry.   Neurological:      Mental Status: She is alert and oriented to person, place, and time.   Psychiatric:         Behavior: Behavior normal.         ASSESSMENT   #1 dysphagia: Appears to be an oral pharyngeal component according to her report by speech pathology  #2 history of throat cancer  #3 history of  polyps      PLAN  Schedule EGD along with colonoscopy for July of this year      ICD-10-CM ICD-9-CM   1. Oropharyngeal dysphagia  R13.12 787.22   2. Odynophagia  R13.10 787.20

## 2023-08-03 ENCOUNTER — HOSPITAL ENCOUNTER (OUTPATIENT)
Dept: MAMMOGRAPHY | Facility: HOSPITAL | Age: 73
Discharge: HOME | End: 2023-08-03
Admitting: INTERNAL MEDICINE
Payer: MEDICARE

## 2023-08-03 DIAGNOSIS — Z12.31 VISIT FOR SCREENING MAMMOGRAM: ICD-10-CM

## 2023-08-03 PROCEDURE — 77067 SCR MAMMO BI INCL CAD: CPT

## 2023-08-03 PROCEDURE — 77063 BREAST TOMOSYNTHESIS BI: CPT

## 2023-10-24 ENCOUNTER — TELEPHONE (OUTPATIENT)
Dept: GASTROENTEROLOGY | Facility: CLINIC | Age: 73
End: 2023-10-24
Payer: MEDICARE

## 2023-10-24 NOTE — TELEPHONE ENCOUNTER
I don't see where she went to ED. This is the fastest way to get it replaced. The opening can close up if not done quickly. Please follow up with her and make sure she got this taken care of.

## 2023-10-24 NOTE — TELEPHONE ENCOUNTER
Returned call to nurse Haddad with [facility]. She reports that the pt's gtube has come out . She states that the pt has had this gtube for years and it looks like it dry rotted and came out.  She reports she does not know who put the tube in.  She is asking if pt should come in and have it replaced or should she go to the ER.  She also reports that there is some drainage around the site.  Advised that Dr Lauren is out for at least 2 wks . Advised would go to ER.   She verb understanding and reports that she also has a call out to pcp.     Update sent to Johana Rodriguez.

## 2023-10-24 NOTE — TELEPHONE ENCOUNTER
[facility] CALLING STATES THAT HER GTUBE CAME OUT CAN SHE COME IN FOR APPOINTMENT TO HAVE IT PUT BACK IN OR DOES SHE NEED TO GO TO ER PLEASE ADVISE     [phone] AV

## 2024-03-13 DIAGNOSIS — I73.9 PERIPHERAL VASCULAR DISEASE, UNSPECIFIED: ICD-10-CM

## 2024-03-13 DIAGNOSIS — I65.23 BILATERAL CAROTID ARTERY STENOSIS: Primary | ICD-10-CM

## 2024-03-13 DIAGNOSIS — I72.4 ANEURYSM OF ARTERY OF LOWER EXTREMITY: ICD-10-CM

## 2024-08-07 PROBLEM — I65.29 CAROTID ARTERY STENOSIS: Status: ACTIVE | Noted: 2024-08-07

## 2024-08-12 ENCOUNTER — HOSPITAL ENCOUNTER (OUTPATIENT)
Facility: HOSPITAL | Age: 74
Discharge: HOME | End: 2024-08-12
Payer: MEDICARE

## 2024-08-12 ENCOUNTER — OFFICE VISIT (OUTPATIENT)
Age: 74
End: 2024-08-12
Payer: MEDICARE

## 2024-08-12 VITALS
WEIGHT: 130.07 LBS | SYSTOLIC BLOOD PRESSURE: 130 MMHG | HEIGHT: 65 IN | DIASTOLIC BLOOD PRESSURE: 74 MMHG | BODY MASS INDEX: 21.67 KG/M2

## 2024-08-12 DIAGNOSIS — I65.23 BILATERAL CAROTID ARTERY STENOSIS: ICD-10-CM

## 2024-08-12 DIAGNOSIS — I65.23 CAROTID STENOSIS, BILATERAL: ICD-10-CM

## 2024-08-12 DIAGNOSIS — I72.4 ANEURYSM OF ARTERY OF LOWER EXTREMITY: ICD-10-CM

## 2024-08-12 DIAGNOSIS — I73.9 PERIPHERAL VASCULAR DISEASE, UNSPECIFIED: ICD-10-CM

## 2024-08-12 DIAGNOSIS — I65.23 BILATERAL CAROTID ARTERY STENOSIS: Primary | ICD-10-CM

## 2024-08-12 DIAGNOSIS — I73.9 PAD (PERIPHERAL ARTERY DISEASE): ICD-10-CM

## 2024-08-12 LAB
BH CV ECHO MEAS - DIST AO DIAM: 1.73 CM
BH CV XLRA MEAS - MID AO DIAM: 1.66 CM
BH CV XLRA MEAS - PAD LEFT ABI DP: 0.86
BH CV XLRA MEAS - PAD LEFT ABI PT: 1
BH CV XLRA MEAS - PAD LEFT ARM: NORMAL MMHG
BH CV XLRA MEAS - PAD LEFT LEG DP: 112 MMHG
BH CV XLRA MEAS - PAD LEFT LEG PT: 130 MMHG
BH CV XLRA MEAS - PAD RIGHT ABI DP: 0.91
BH CV XLRA MEAS - PAD RIGHT ABI PT: 0.95
BH CV XLRA MEAS - PAD RIGHT ARM: NORMAL MMHG
BH CV XLRA MEAS - PAD RIGHT LEG DP: 118 MMHG
BH CV XLRA MEAS - PAD RIGHT LEG PT: 124 MMHG
BH CV XLRA MEAS - PROX AO DIAM: 2.55 CM
BH CV XLRA MEAS LEFT CAROTID BULB EDV: -15.2 CM/SEC
BH CV XLRA MEAS LEFT CAROTID BULB PSV: -65.7 CM/SEC
BH CV XLRA MEAS LEFT DIST CCA EDV: 21.1 CM/SEC
BH CV XLRA MEAS LEFT DIST CCA PSV: 67.4 CM/SEC
BH CV XLRA MEAS LEFT DIST ICA EDV: -26.7 CM/SEC
BH CV XLRA MEAS LEFT DIST ICA PSV: -83.3 CM/SEC
BH CV XLRA MEAS LEFT ICA/CCA RATIO: 1.43
BH CV XLRA MEAS LEFT MID CCA EDV: 19.9 CM/SEC
BH CV XLRA MEAS LEFT MID CCA PSV: 85.6 CM/SEC
BH CV XLRA MEAS LEFT MID ICA EDV: -30.5 CM/SEC
BH CV XLRA MEAS LEFT MID ICA PSV: -85.6 CM/SEC
BH CV XLRA MEAS LEFT PROX CCA EDV: 26.7 CM/SEC
BH CV XLRA MEAS LEFT PROX CCA PSV: 99.8 CM/SEC
BH CV XLRA MEAS LEFT PROX ECA EDV: -15.2 CM/SEC
BH CV XLRA MEAS LEFT PROX ECA PSV: -88.5 CM/SEC
BH CV XLRA MEAS LEFT PROX ICA EDV: -33.4 CM/SEC
BH CV XLRA MEAS LEFT PROX ICA PSV: -96.7 CM/SEC
BH CV XLRA MEAS LEFT PROX SCLA PSV: 124.1 CM/SEC
BH CV XLRA MEAS LEFT VERTEBRAL A EDV: 12.9 CM/SEC
BH CV XLRA MEAS LEFT VERTEBRAL A PSV: 43.4 CM/SEC
BH CV XLRA MEAS RIGHT CAROTID BULB EDV: 28.7 CM/SEC
BH CV XLRA MEAS RIGHT CAROTID BULB PSV: 82.7 CM/SEC
BH CV XLRA MEAS RIGHT DIST CCA EDV: 20.5 CM/SEC
BH CV XLRA MEAS RIGHT DIST CCA PSV: 73.3 CM/SEC
BH CV XLRA MEAS RIGHT DIST ICA EDV: -30.5 CM/SEC
BH CV XLRA MEAS RIGHT DIST ICA PSV: -87.4 CM/SEC
BH CV XLRA MEAS RIGHT ICA/CCA RATIO: 1.19
BH CV XLRA MEAS RIGHT MID CCA EDV: 17.6 CM/SEC
BH CV XLRA MEAS RIGHT MID CCA PSV: 64.5 CM/SEC
BH CV XLRA MEAS RIGHT MID ICA EDV: -28.1 CM/SEC
BH CV XLRA MEAS RIGHT MID ICA PSV: -79.2 CM/SEC
BH CV XLRA MEAS RIGHT PROX CCA EDV: -18.2 CM/SEC
BH CV XLRA MEAS RIGHT PROX CCA PSV: -62.1 CM/SEC
BH CV XLRA MEAS RIGHT PROX ECA EDV: 19.6 CM/SEC
BH CV XLRA MEAS RIGHT PROX ECA PSV: 137.5 CM/SEC
BH CV XLRA MEAS RIGHT PROX ICA EDV: -29.3 CM/SEC
BH CV XLRA MEAS RIGHT PROX ICA PSV: -69.8 CM/SEC
BH CV XLRA MEAS RIGHT PROX SCLA PSV: 126.7 CM/SEC
BH CV XLRA MEAS RIGHT VERTEBRAL A EDV: 11.1 CM/SEC
BH CV XLRA MEAS RIGHT VERTEBRAL A PSV: 35.8 CM/SEC

## 2024-08-12 PROCEDURE — 1159F MED LIST DOCD IN RCRD: CPT | Performed by: NURSE PRACTITIONER

## 2024-08-12 PROCEDURE — 1160F RVW MEDS BY RX/DR IN RCRD: CPT | Performed by: NURSE PRACTITIONER

## 2024-08-12 PROCEDURE — 99213 OFFICE O/P EST LOW 20 MIN: CPT | Performed by: NURSE PRACTITIONER

## 2024-08-12 PROCEDURE — 93799 UNLISTED CV SVC/PROCEDURE: CPT

## 2024-08-12 PROCEDURE — 93880 EXTRACRANIAL BILAT STUDY: CPT

## 2024-08-12 PROCEDURE — 93880 EXTRACRANIAL BILAT STUDY: CPT | Performed by: SURGERY

## 2024-08-12 NOTE — PROGRESS NOTES
Chief Complaint  No chief complaint on file.    Subjective        Brittney Crooks presents to [facility] VASCULAR SURGERY  HPI   Brittney Crooks is a 74 y.o. female that has been followed in our office for carotid artery stenosis. She returns today in follow up along with a carotid duplex.  She also had a PAD and abdominal aortic aneurysm screening due to a family history of aneurysmal disease.  She  reports she has been treated for throat cancer since the last visit.  She required chemotherapy and radiation.  She denies any symptoms consistent with CVA, TIA, or amaurosis fugax. She  denies any worsening claudication symptoms, rest pain, tissue loss.     Review of Systems   Constitutional:  Negative for fever.   Eyes:  Negative for visual disturbance.   Cardiovascular:  Negative for leg swelling.   Gastrointestinal:  Negative for abdominal pain.   Musculoskeletal:  Negative for back pain.   Skin:  Negative for color change, pallor and wound.   Neurological:  Negative for dizziness, facial asymmetry, speech difficulty and weakness.        Brittney Crooks  reports that she quit smoking about 2 years ago. Her smoking use included cigarettes. She does not have any smokeless tobacco history on file..        Objective   Vital Signs:  Vitals:    08/12/24 1021   BP: 130/74      Body mass index is 21.67 kg/m².   BMI is within normal parameters. No other follow-up for BMI required.     130/74  Physical Exam  Vitals reviewed.   Constitutional:       Appearance: Normal appearance.   HENT:      Head: Normocephalic.   Cardiovascular:      Rate and Rhythm: Normal rate and regular rhythm.      Pulses:           Dorsalis pedis pulses are 3+ on the right side and 3+ on the left side.        Posterior tibial pulses are 3+ on the right side and 3+ on the left side.   Pulmonary:      Effort: Pulmonary effort is normal.   Skin:     General: Skin is warm.   Neurological:      General: No focal deficit present.       Mental Status: She is alert and oriented to person, place, and time.   Psychiatric:         Mood and Affect: Mood normal.          Result Review :      Previous carotid duplex: Less than 50% stenosis bilaterally    Previous aortic screening: Largest measurement of her aorta 2.32 cm.    Previous JOHN screenin.98 and greater than 1.    Carotid duplex from today: Duplex Carotid Ultrasound CAR (2024 09:46)     vascular Screening (Peripheral Artery) CAR (2024 09:43)   Vascular Screening (Aortic Aneurysm) CAR (2024 09:46)                    Assessment and Plan     Diagnoses and all orders for this visit:    1. Bilateral carotid artery stenosis (Primary)    2. Carotid stenosis, bilateral  -     Duplex Carotid Ultrasound CAR; Future    3. PAD (peripheral artery disease)  -     Doppler Ankle Brachial Index Single Level CAR; Future             Patient present today for follow up of carotid artery stenosis.  She has mild disease bilaterally.  She had a peripheral arterial and abdominal aortic screening due to family history of aneurysmal disease.  She has mildly decreased ABIs, though she denies any symptoms.  She does not have an abdominal aortic aneurysm.  We will recheck this in 3 to 5 years.  She is to continue her antiplatelet agent which is aspirin. She is on a statin for cholesterol control.  We discussed adequate blood pressure control. She will return in 2 years along with a repeat carotid artery duplex and ABIs.    Follow Up     Return in about 2 years (around 2026) for carotid duplex.  Patient was given instructions and counseling regarding her condition or for health maintenance advice. Please see specific information pulled into the AVS if appropriate.     YURIDIA Cesar

## 2024-10-22 ENCOUNTER — TRANSCRIBE ORDERS (OUTPATIENT)
Dept: ADMINISTRATIVE | Facility: HOSPITAL | Age: 74
End: 2024-10-22
Payer: MEDICARE

## 2024-10-22 DIAGNOSIS — Z12.31 VISIT FOR SCREENING MAMMOGRAM: Primary | ICD-10-CM

## 2024-12-18 ENCOUNTER — HOSPITAL ENCOUNTER (OUTPATIENT)
Dept: MAMMOGRAPHY | Facility: HOSPITAL | Age: 74
Discharge: HOME | End: 2024-12-18
Payer: MEDICARE

## 2024-12-18 DIAGNOSIS — Z12.31 VISIT FOR SCREENING MAMMOGRAM: ICD-10-CM

## 2024-12-18 PROCEDURE — 77067 SCR MAMMO BI INCL CAD: CPT

## 2024-12-18 PROCEDURE — 77063 BREAST TOMOSYNTHESIS BI: CPT

## 2025-05-20 ENCOUNTER — TELEPHONE (OUTPATIENT)
Dept: GASTROENTEROLOGY | Facility: CLINIC | Age: 75
End: 2025-05-20
Payer: MEDICARE

## 2025-05-20 DIAGNOSIS — R13.12 OROPHARYNGEAL DYSPHAGIA: Primary | ICD-10-CM

## 2025-05-20 NOTE — TELEPHONE ENCOUNTER
[facility] staff attempted to follow warm transfer process and was unsuccessful     Caller: Brittney Crooks    Relationship to patient: Self    Best call back number: [phone]    Patient is needing: PT NEEDS AN APPT TO HAVE HER FEEDING TUBE SWITCHED OUT.  [facility] NOT SURE IF THIS IS AN OFFICE VISIT OR A PROCEDURE.  PLEASE CALL PT TO SCHEDULE.   [facility] TRIED TO WT BUT NO RESPONSE.

## 2025-05-20 NOTE — TELEPHONE ENCOUNTER
Dr Lauren,   How would you like for them to proceed?  Can you change the gtube or will she need a surgical referral?

## 2025-05-21 NOTE — TELEPHONE ENCOUNTER
Called pt and passed on Dr Lauren's recommendations  gave her the number to [facility] surgical associates to call and schedule.

## 2025-05-22 ENCOUNTER — OFFICE VISIT (OUTPATIENT)
Dept: SURGERY | Facility: CLINIC | Age: 75
End: 2025-05-22
Payer: MEDICARE

## 2025-05-22 VITALS
OXYGEN SATURATION: 95 % | BODY MASS INDEX: 25.59 KG/M2 | SYSTOLIC BLOOD PRESSURE: 128 MMHG | HEART RATE: 69 BPM | WEIGHT: 153.6 LBS | HEIGHT: 65 IN | DIASTOLIC BLOOD PRESSURE: 76 MMHG

## 2025-05-22 DIAGNOSIS — R13.10 DYSPHAGIA, UNSPECIFIED TYPE: ICD-10-CM

## 2025-05-22 DIAGNOSIS — Z93.1: Primary | ICD-10-CM

## 2025-05-23 NOTE — PROGRESS NOTES
I have reviewed the notes, assessments, and/or procedures performed by Ej Brooks, I concur with her/his documentation of Brittney Crooks.

## 2025-05-23 NOTE — PROGRESS NOTES
ASSESSMENT/PLAN:    75-year-old lady with a history of significant dysphagia and aspiration with a gastrostomy tube in place.  As she wishes to transition to [facility] for her care, we will take over her tube exchanges.  Her current tube has been in for several months however it is functioning well, not leaking and having no issues.  As such we will keep it in place but have her follow-up with me in 1 month at which time we will exchange her tube for a new Avanos ENFit tube.  All questions were answered and if she develops any complications with her current tube before then she can contact us and we will manage it accordingly.    CC:     Tube exchange    HPI:    This is a 75-year-old lady presenting to the office today at the request of Dr. Karri Lauren for consultation.  She has a history significant for esophageal dysphagia initially undergoing PEG tube placement for tube feeds in October 2022.  She has since been using her G-tube for her liquids as she has been able to recently tolerate thicker liquids and solid foods without difficulty.  Her tube did fall out and need to be replaced at a [place] emergency room and was replaced with an ENFit style G-tube.  She believes that her last tube exchange was approximately 6 months ago.  She wishes to transition to [facility] for her care and future tube exchanges.  Currently she says that her tube is working well, having no issues with leakage around the tube, clogging of the tube, or breakdown of the tube or any other concerns.    ENDOSCOPY:   Colonoscopy 2023 normal  EGD 2023: Esophageal stricture, gastritis, duodenitis    RADIOLOGY:   Swallow study 2/26/2025: Aspiration of thin liquids in the absence of chin tuck maneuver, only laryngeal penetration occurred with swallowing thin liquids with chin tuck, laryngeal penetration without evidence of tracheal aspiration of nectar thick liquids, mild residual vallecular pooling is seen with solids and resolved with subsequent  swallowing of thin liquids using chin tuck maneuver    SOCIAL HISTORY:   Denies tobacco use  Denies alcohol use    FAMILY HISTORY:    Colorectal cancer: None    PREVIOUS ABDOMINAL SURGERY    Abdominal ligation    OTHER SURGERY  Past Surgical History:   Procedure Laterality Date    CARDIAC SURGERY      Insertable cardiac monitor    CATARACT EXTRACTION, BILATERAL      COLONOSCOPY N/A 02/16/2018    Procedure: COLONOSCOPY INTO CECUM & TERMINAL ILEUM;  Surgeon: Karri ALFRED MD;  Location:  [facility];  Service:     COLONOSCOPY N/A 07/12/2023    Procedure: COLONOSCOPY into cecum & TI;  Surgeon: Karri Lauren MD;  Location:  [facility];  Service: Gastroenterology;  Laterality: N/A;  pre- screning  post- diverticulosis, hemorrhoids    COLONOSCOPY  2012    COLONOSCOPY  02/05/2018    ENDOSCOPY  07/12/2023    Procedure: ESOPHAGOGASTRODUODENOSCOPY with biopsies;  Surgeon: Karri Lauren MD;  Location:  [facility];  Service: Gastroenterology;;  pre- dysphagia  post- proximal esphageal stricture, gastritis, duodenitis    ENDOSCOPY W/ PEG TUBE PLACEMENT      OCT 2022    INNER EAR SURGERY      MEDIPORT REMOVAL      OTHER SURGICAL HISTORY      watchman device    TRANSESOPHAGEAL ECHOCARDIOGRAM (DARIN) AND CARDIOVERSION      JUNE 2023    TUBAL ABDOMINAL LIGATION         PAST MEDICAL HISTORY:    Past Medical History:   Diagnosis Date    Atrial fibrillation     Bilateral carotid artery stenosis 11/09/2015    Cancer     right calf    Difficulty swallowing     Drug therapy     GERD (gastroesophageal reflux disease)     H/O colonoscopy with polypectomy     Hx of radiation therapy     Hyperlipidemia     Nicotine dependence, cigarettes, uncomplicated 11/07/2016    Stroke     Tachycardia     Throat cancer     Uses feeding tube        MEDICATIONS:     Current Outpatient Medications:     amiodarone (PACERONE) 200 MG tablet, Take 1 tablet by mouth Daily., Disp: , Rfl:     aspirin 81 MG EC tablet, Take 1 tablet  by mouth Daily., Disp: , Rfl:     atorvastatin (LIPITOR) 20 MG tablet, Take 1 tablet by mouth Daily., Disp: , Rfl:     Cetirizine HCl (zyrTEC) 1 MG/ML syrup, Take  by mouth Daily., Disp: , Rfl:     diphenhydrAMINE (BENADRYL) 25 mg capsule, Take 1 capsule by mouth 4 (Four) Times a Day., Disp: , Rfl:     metoclopramide (REGLAN) 10 MG tablet, Take 1 tablet by mouth 4 (Four) Times a Day Before Meals & at Bedtime., Disp: , Rfl:     metoprolol succinate XL (TOPROL-XL) 25 MG 24 hr tablet, Take 0.5 tablets by mouth., Disp: , Rfl:     metoprolol tartrate (LOPRESSOR) 100 MG tablet, Take 1 tablet by mouth 2 (Two) Times a Day., Disp: , Rfl:     Multiple Vitamins-Minerals (MULTIVITAMIN ADULT PO), Take 1 tablet by mouth Daily., Disp: , Rfl:     Omega-3 Fatty Acids (FISH OIL) 1000 MG capsule capsule, Take 1 capsule by mouth Daily With Breakfast., Disp: , Rfl:     Omega-3 Fatty Acids (fish oil) 1200 MG capsule capsule, Take 1 capsule by mouth Daily., Disp: , Rfl:     probiotic (CULTURELLE) capsule capsule, Take  by mouth Daily., Disp: , Rfl:     rivaroxaban (XARELTO) 10 MG tablet, Take 2 tablets by mouth Daily., Disp: , Rfl:     Diclofenac Sodium (VOLTAREN PO), Take 75 mg by mouth 2 (Two) Times a Day. Delayed Release (si tablet twice per day ) (Patient not taking: Reported on 2025), Disp: , Rfl:     lidocaine (LIDODERM) 5 %, Place 3 patches on the skin as directed by provider Take As Directed. (sig: 3 patch twice per day ); Remove & Discard patch within 12 hours or as directed by MD (Patient not taking: Reported on 2025), Disp: , Rfl:     loratadine (CLARITIN) 10 MG tablet, Take 1 tablet by mouth Daily. (Patient not taking: Reported on 2025), Disp: , Rfl:     omeprazole (priLOSEC) 20 MG capsule, Take 1 capsule by mouth Daily. Delayed Release (si tablet once per day ) (Patient not taking: Reported on 2025), Disp: , Rfl:     omeprazole (priLOSEC) 40 MG capsule, Take 1 capsule by mouth Daily. My open  "capsule and flush content thru G tube (Patient not taking: Reported on 5/22/2025), Disp: 30 capsule, Rfl: 1    ALLERGIES:   Allergies   Allergen Reactions    Penicillins Swelling and Shortness Of Breath    Sulfa Antibiotics Unknown - High Severity and Unknown - Low Severity     SULFA.         PHYSICAL EXAM:   Constitutional: Well-developed well-nourished, no acute distress  Vital signs:   Right 65\"  Weight 153  BMI 25.6  Neck: Supple, no palpable mass, trachea midline  Respiratory: Clear to auscultation, normal inspiratory effort  Cardiovascular: Regular rate, no murmur, no carotid bruit  Gastrointestinal: Soft, nontender  Psychiatric: Alert and oriented ×3, normal affect   BMI is >= 25 and <30. (Overweight) The following options were offered after discussion;: weight loss educational material (shared in after visit summary)      Ej Brooks PA-C    [facility] - General Surgery  [address], [address]  [place] [id number]    [address], [address]  [place] [id number]    Office: [phone]  Fax: [phone]     "

## 2025-06-27 ENCOUNTER — OFFICE VISIT (OUTPATIENT)
Dept: SURGERY | Facility: CLINIC | Age: 75
End: 2025-06-27
Payer: MEDICARE

## 2025-06-27 VITALS
OXYGEN SATURATION: 95 % | BODY MASS INDEX: 25.33 KG/M2 | WEIGHT: 152 LBS | HEIGHT: 65 IN | DIASTOLIC BLOOD PRESSURE: 74 MMHG | HEART RATE: 66 BPM | SYSTOLIC BLOOD PRESSURE: 128 MMHG

## 2025-06-27 DIAGNOSIS — Z93.1: Primary | ICD-10-CM

## 2025-06-27 NOTE — PROGRESS NOTES
CC:  G-tube check    S:  75-year-old lady returning to the office today for reevaluation of her G-tube.  This was placed due to a history of significant dysphagia and aspiration.  She continues to have no issues with her current G-tube, with it staying in place and not leaking.  As it is doing well she wishes to continue to keep this 1 in place and will contact us if and when she has an issue with this current G-tube to have it replaced with an ENFit style.    O:  Vital signs noted  Abdomen: G-tube in place left upper quadrant no signs of leakage or drainage, no granulation tissue, nontender throughout  Psych: Alert and orient x 3    A/P:  As this tube continues to do well and she has no issues associated with it, continued observation is appropriate.  If she begins to develop leakage, notices damage to the tube or it does fall out, she can contact our office and we will get her in that day to have it replaced with an Avanos ENFit tube.  All questions were answered and she was willing to proceed with all recommendations.    Ej Brooks PA-C

## 2025-07-25 DIAGNOSIS — I65.23 CAROTID STENOSIS, BILATERAL: Primary | ICD-10-CM

## 2025-07-25 DIAGNOSIS — I73.9 PERIPHERAL VASCULAR DISEASE, UNSPECIFIED: ICD-10-CM

## (undated) DEVICE — CANN NASL CO2 TRULINK W/O2 A/

## (undated) DEVICE — TUBING, SUCTION, 1/4" X 10', STRAIGHT: Brand: MEDLINE

## (undated) DEVICE — THE TORRENT IRRIGATION SCOPE CONNECTOR IS USED WITH THE TORRENT IRRIGATION TUBING TO PROVIDE IRRIGATION FLUIDS SUCH AS STERILE WATER DURING GASTROINTESTINAL ENDOSCOPIC PROCEDURES WHEN USED IN CONJUNCTION WITH AN IRRIGATION PUMP (OR ELECTROSURGICAL UNIT).: Brand: TORRENT

## (undated) DEVICE — Device: Brand: DEFENDO AIR/WATER/SUCTION AND BIOPSY VALVE